# Patient Record
(demographics unavailable — no encounter records)

---

## 2025-01-09 NOTE — IMAGING
[de-identified] : On examination, patient has decreased range of motion to the lumbar spine. Pain with forward flexion. Hamstrings are tight bilaterally. Patient has positive contralateral straight leg raise to the left, positive straight leg raise to the right. Patient has tenderness when palpating over the right lumbar paraspinal muscles and over the piriformis. Nontender over the hamstring, nontender over the right iliotibial band. Patient has mild decreased motor strength to the right lower extremity when compared to the left. Neurovascular intact.  Normal gait.  Radiographs of the lumbar spine, 4 views were taken, negative for any acute fracture or dislocation.

## 2025-01-09 NOTE — DISCUSSION/SUMMARY
[de-identified] : Impression: Lumbar strain with radiculopathy to the right lower extremity  Plan: Patient was advised for physical therapy. Patient was advised to do activities as tolerated. Patient was advised for muscle relaxant anti-inflammatories, prescription was sent to the pharmacy.  Elevator pass and no gym prescription was given  Follow-up:  4 weeks for repeat evaluation

## 2025-01-09 NOTE — HISTORY OF PRESENT ILLNESS
[de-identified] : 17-year-old male here for an evaluation of pain to the right lower extremity, patient states that his pain is started today, he states that he stretched, and he felt pain from the lower aspect of his back and he is leg posteriorly, patient states that his leg gave out. Patient said that the pain at rest is 6/10, pain with activity is 9/10.  Patient plays baseball, he also plays basketball as a hobby.

## 2025-02-03 NOTE — HISTORY OF PRESENT ILLNESS
[de-identified] : ORIGINAL PRESENTATION:  18-year-old male here for an evaluation of pain to the right lower extremity, patient states that his pain is started today, he states that he stretched, and he felt pain from the lower aspect of his back and he is leg posteriorly, patient states that his leg gave out.  TODAY:  I had the pleasure of seeing James accompanied by his mother today in follow up.  His previous history and physical findings have been reviewed.  He was initially seen in the walk in for evaluation of lower back pain s/p stretching and pulling his back.  He attended 2 sessions of PT and did use anti inflammatory medication in conjunction with a muscle relaxant and states his pain has completely subsided.  He denies any pain, loss of motion, or parasthesias and we will hold off with respect to any further treatment or testing at this time and patient and his mother are agreeable.

## 2025-02-03 NOTE — IMAGING
[de-identified] : lumbar spine;  5/5 strength, 2+ reflexes, NV intact, - slr bilaterally, full ROM, no tenderness to palpation.

## 2025-02-03 NOTE — REVIEW OF SYSTEMS
Reviewed and discussed results and recommendations.  Pt agrees with plan of care to start medication and repeat lab in 3 months.  Pt declines her lab results mailed to her.   [Negative] : Neurological

## 2025-02-03 NOTE — ASSESSMENT
[FreeTextEntry1] : 18 year old male with lumbar strain that has resolved.  He will f/u as needed and is aware if there is any issue he can contact the office and he and his mother verbalize their understanding and agreement.

## 2025-02-03 NOTE — REASON FOR VISIT
[facility] Brief Operative Note    RADIOLOGY POST PROCEDURE NOTE    Patient name: Babita Hartley  MRN: [id number]  : 1983    Pre-procedure diagnosis: cervical radiculopathy  Post-procedure diagnosis: Same    Procedure Date/Time: 2020  11:13 AM  Procedure: cervical epidural steroid injection   Estimated blood loss: None  Specimen(s) collected with description: none    The patient tolerated the procedure well with no immediate complications.  Significant findings:none    See imaging dictation for procedural details.    Provider name: Peter Maldonado DO  Assistant(s):None          
[FreeTextEntry2] : lower back pain